# Patient Record
Sex: FEMALE | Race: BLACK OR AFRICAN AMERICAN | NOT HISPANIC OR LATINO | Employment: PART TIME | URBAN - METROPOLITAN AREA
[De-identification: names, ages, dates, MRNs, and addresses within clinical notes are randomized per-mention and may not be internally consistent; named-entity substitution may affect disease eponyms.]

---

## 2020-05-26 PROBLEM — R10.32 LLQ ABDOMINAL PAIN: Status: ACTIVE | Noted: 2020-05-26

## 2020-07-21 PROBLEM — K59.09 CHRONIC CONSTIPATION: Status: ACTIVE | Noted: 2020-07-21

## 2020-07-21 PROBLEM — R11.0 NAUSEA: Status: ACTIVE | Noted: 2020-07-21

## 2020-07-21 PROBLEM — R19.8 TENESMUS (RECTAL): Status: ACTIVE | Noted: 2020-07-21

## 2020-07-21 PROBLEM — R10.13 EPIGASTRIC PAIN: Status: ACTIVE | Noted: 2020-07-21

## 2021-10-19 PROBLEM — R76.8 FALSE POSITIVE SYPHILIS SEROLOGY: Status: ACTIVE | Noted: 2021-10-19

## 2022-04-06 PROBLEM — G89.29 CHRONIC MIDLINE LOW BACK PAIN WITHOUT SCIATICA: Status: ACTIVE | Noted: 2018-06-29

## 2022-04-06 PROBLEM — R11.0 NAUSEA: Status: RESOLVED | Noted: 2020-07-21 | Resolved: 2022-04-06

## 2022-04-06 PROBLEM — R55 SYNCOPE AND COLLAPSE: Status: ACTIVE | Noted: 2022-04-06

## 2022-04-06 PROBLEM — R10.13 EPIGASTRIC PAIN: Status: RESOLVED | Noted: 2020-07-21 | Resolved: 2022-04-06

## 2022-04-06 PROBLEM — R19.8 TENESMUS (RECTAL): Status: RESOLVED | Noted: 2020-07-21 | Resolved: 2022-04-06

## 2022-04-06 PROBLEM — R76.8 FALSE POSITIVE SYPHILIS SEROLOGY: Status: RESOLVED | Noted: 2021-10-19 | Resolved: 2022-04-06

## 2022-04-06 PROBLEM — E88.09 HYPOALBUMINEMIA: Status: ACTIVE | Noted: 2022-04-06

## 2022-04-06 PROBLEM — E87.8 HYPOCHLOREMIA: Status: ACTIVE | Noted: 2022-04-06

## 2022-04-06 PROBLEM — I10 ESSENTIAL HYPERTENSION: Chronic | Status: ACTIVE | Noted: 2022-04-06

## 2022-04-06 PROBLEM — S09.90XA HEAD TRAUMA: Status: ACTIVE | Noted: 2022-04-06

## 2022-04-06 PROBLEM — R10.32 LLQ ABDOMINAL PAIN: Status: RESOLVED | Noted: 2020-05-26 | Resolved: 2022-04-06

## 2022-04-06 PROBLEM — R94.31 ABNORMAL EKG: Status: ACTIVE | Noted: 2022-04-06

## 2022-04-06 PROBLEM — E83.51 HYPOCALCEMIA: Status: ACTIVE | Noted: 2022-04-06

## 2022-04-06 PROBLEM — M54.50 CHRONIC MIDLINE LOW BACK PAIN WITHOUT SCIATICA: Status: ACTIVE | Noted: 2018-06-29

## 2022-04-06 PROBLEM — K59.09 CHRONIC CONSTIPATION: Status: RESOLVED | Noted: 2020-07-21 | Resolved: 2022-04-06

## 2022-04-07 PROBLEM — S09.90XA HEAD TRAUMA: Status: RESOLVED | Noted: 2022-04-06 | Resolved: 2022-04-07

## 2022-04-07 PROBLEM — E87.6 HYPOKALEMIA: Status: RESOLVED | Noted: 2022-04-07 | Resolved: 2022-04-07

## 2022-04-07 PROBLEM — R55 SYNCOPE AND COLLAPSE: Status: RESOLVED | Noted: 2022-04-06 | Resolved: 2022-04-07

## 2022-04-07 PROBLEM — E87.8 HYPERCHLOREMIA: Status: RESOLVED | Noted: 2022-04-06 | Resolved: 2022-04-07

## 2022-04-07 PROBLEM — E87.6 HYPOKALEMIA: Status: ACTIVE | Noted: 2022-04-07

## 2022-04-07 PROBLEM — E88.09 HYPOALBUMINEMIA: Status: RESOLVED | Noted: 2022-04-06 | Resolved: 2022-04-07

## 2022-05-24 ENCOUNTER — SOCIAL WORK (OUTPATIENT)
Dept: ADMINISTRATIVE | Facility: OTHER | Age: 37
End: 2022-05-24
Payer: MEDICAID

## 2022-05-24 NOTE — PROGRESS NOTES
SW met with pt regarding initial OB assessment. Pt stated this is her 6th pregnancy/1-miscarriage. Pt stated lives with her /children-18,8,6,4 and able to perform ADL's independently. Pt stated does work. Pt stated support system is her /Kenzie. Pt stated has medicaid([employer]). Pt stated does not have WIC. Pt stated is going to breastfeed. SW provide pt with information on other community resources.SW faxed and scanned pt's notification of pregnancy into epic.  No other needs identified at this time.    Suze Gonzalez,MSW  Pager#[phone]

## 2022-06-09 PROBLEM — R10.13 EPIGASTRIC PAIN DURING PREGNANCY, ANTEPARTUM: Status: ACTIVE | Noted: 2022-06-09

## 2022-06-09 PROBLEM — O26.899 EPIGASTRIC PAIN DURING PREGNANCY, ANTEPARTUM: Status: ACTIVE | Noted: 2022-06-09

## 2022-07-02 PROBLEM — R10.33: Status: ACTIVE | Noted: 2022-07-02

## 2022-07-02 PROBLEM — O26.891: Status: ACTIVE | Noted: 2022-07-02

## 2022-07-12 PROBLEM — Z98.891 HISTORY OF CESAREAN DELIVERY: Status: ACTIVE | Noted: 2022-07-12

## 2022-07-12 PROBLEM — Z98.891 HISTORY OF VBAC: Status: ACTIVE | Noted: 2022-07-12

## 2022-07-12 PROBLEM — O09.92 SUPERVISION OF HIGH RISK PREGNANCY IN SECOND TRIMESTER: Status: ACTIVE | Noted: 2022-07-12

## 2022-07-12 PROBLEM — A60.9 HSV (HERPES SIMPLEX VIRUS) ANOGENITAL INFECTION: Status: ACTIVE | Noted: 2022-07-12

## 2022-07-12 PROBLEM — O09.522 MULTIGRAVIDA OF ADVANCED MATERNAL AGE IN SECOND TRIMESTER: Status: ACTIVE | Noted: 2022-07-12

## 2022-07-14 ENCOUNTER — SOCIAL WORK (OUTPATIENT)
Dept: ADMINISTRATIVE | Facility: OTHER | Age: 37
End: 2022-07-14
Payer: MEDICAID

## 2022-07-14 NOTE — PROGRESS NOTES
SW received pt's FMLA paperwork from the pt. SW completed the demographic sheet/attached clinical note on pt's FMLA paperwork and gave the paperwork to MD for review/signature.SW later received pt's completed FMLA Paperwork from MD and fax to([phone])[facility] [employer]. Pt is aware of paperwork completed and faxed. SW scanned paperwork into epic. No other needs identified at this time.    Suze Gonzalez,MSW  Pager#[phone]

## 2022-08-02 DIAGNOSIS — U07.1 COVID-19 VIRUS DETECTED: ICD-10-CM

## 2022-08-02 PROBLEM — O98.512 COVID-19 AFFECTING PREGNANCY IN SECOND TRIMESTER: Status: ACTIVE | Noted: 2022-08-02

## 2022-08-24 PROBLEM — O26.899 EPIGASTRIC PAIN DURING PREGNANCY, ANTEPARTUM: Status: RESOLVED | Noted: 2022-06-09 | Resolved: 2022-08-24

## 2022-08-24 PROBLEM — G89.29 CHRONIC MIDLINE LOW BACK PAIN WITHOUT SCIATICA: Status: RESOLVED | Noted: 2018-06-29 | Resolved: 2022-08-24

## 2022-08-24 PROBLEM — M54.50 CHRONIC MIDLINE LOW BACK PAIN WITHOUT SCIATICA: Status: RESOLVED | Noted: 2018-06-29 | Resolved: 2022-08-24

## 2022-08-24 PROBLEM — R10.13 EPIGASTRIC PAIN DURING PREGNANCY, ANTEPARTUM: Status: RESOLVED | Noted: 2022-06-09 | Resolved: 2022-08-24

## 2022-09-21 PROBLEM — O26.891: Status: RESOLVED | Noted: 2022-07-02 | Resolved: 2022-09-21

## 2022-09-21 PROBLEM — R10.33: Status: RESOLVED | Noted: 2022-07-02 | Resolved: 2022-09-21

## 2022-12-09 PROBLEM — O09.93 SUPERVISION OF HIGH RISK PREGNANCY IN THIRD TRIMESTER: Status: ACTIVE | Noted: 2022-07-12

## 2022-12-09 PROBLEM — O26.893 PREGNANCY HEADACHE IN THIRD TRIMESTER: Status: ACTIVE | Noted: 2022-12-09

## 2022-12-09 PROBLEM — R10.13 EPIGASTRIC PAIN: Status: ACTIVE | Noted: 2022-12-09

## 2022-12-09 PROBLEM — O47.9 UTERINE CONTRACTIONS AT GREATER THAN 20 WEEKS OF GESTATION: Status: ACTIVE | Noted: 2022-12-09

## 2022-12-09 PROBLEM — R51.9 PREGNANCY HEADACHE IN THIRD TRIMESTER: Status: ACTIVE | Noted: 2022-12-09

## 2022-12-16 PROBLEM — O47.9 UTERINE CONTRACTIONS DURING PREGNANCY: Status: ACTIVE | Noted: 2022-12-16

## 2022-12-27 ENCOUNTER — SOCIAL WORK (OUTPATIENT)
Dept: ADMINISTRATIVE | Facility: OTHER | Age: 37
End: 2022-12-27
Payer: MEDICAID

## 2022-12-27 NOTE — PROGRESS NOTES
YARI made phone call to pt's([phone])regarding her FMLA paperwork. YARI informed pt has received FMLA paperwork via fax from Samantha Ron-medical assistant at the [facility].YARI informed pt one of the page is fill out incorrectly;per pt will bring another copy of the FMLA paperwork to be corrected. YARI completed the demographic sheet/attached clinical note on pt's FMLA paperwork and gave the paperwork to MD for review/signature.YAIR later received pt's completed FMLA Paperwork from MD and fax to([phone])[facility]. SW provide pt with a copy of the FMLA paperwork. YARI scanned the paperwork into epic. No other needs identified at this time.    Suze Gonzalez,MSW  Pager#[phone]

## 2022-12-28 PROBLEM — O28.8 EQUIVOCAL NON-STRESS TEST: Status: ACTIVE | Noted: 2022-12-28

## 2022-12-29 PROBLEM — O34.219 VBAC, DELIVERED, CURRENT HOSPITALIZATION: Status: ACTIVE | Noted: 2022-12-29

## 2023-08-25 PROBLEM — Z32.01 PREGNANCY TEST POSITIVE: Status: ACTIVE | Noted: 2022-06-16

## 2023-08-25 PROBLEM — M54.50 LOW BACK PAIN: Status: ACTIVE | Noted: 2021-08-16

## 2023-08-25 PROBLEM — Z13.220 ENCOUNTER FOR LIPID SCREENING FOR CARDIOVASCULAR DISEASE: Status: ACTIVE | Noted: 2021-04-16

## 2023-08-25 PROBLEM — O09.522 MULTIGRAVIDA OF ADVANCED MATERNAL AGE IN SECOND TRIMESTER: Status: RESOLVED | Noted: 2022-07-12 | Resolved: 2023-08-25

## 2023-08-25 PROBLEM — R51.9 PREGNANCY HEADACHE IN THIRD TRIMESTER: Status: RESOLVED | Noted: 2022-12-09 | Resolved: 2023-08-25

## 2023-08-25 PROBLEM — O47.9 UTERINE CONTRACTIONS DURING PREGNANCY: Status: RESOLVED | Noted: 2022-12-16 | Resolved: 2023-08-25

## 2023-08-25 PROBLEM — I10 ESSENTIAL HYPERTENSION: Chronic | Status: ACTIVE | Noted: 2021-04-16

## 2023-08-25 PROBLEM — O98.512 COVID-19 AFFECTING PREGNANCY IN SECOND TRIMESTER: Status: RESOLVED | Noted: 2022-08-02 | Resolved: 2023-08-25

## 2023-08-25 PROBLEM — O47.9 UTERINE CONTRACTIONS AT GREATER THAN 20 WEEKS OF GESTATION: Status: RESOLVED | Noted: 2022-12-09 | Resolved: 2023-08-25

## 2023-08-25 PROBLEM — Z32.01 PREGNANCY TEST POSITIVE: Status: RESOLVED | Noted: 2022-06-16 | Resolved: 2023-08-25

## 2023-08-25 PROBLEM — O09.93 SUPERVISION OF HIGH RISK PREGNANCY IN THIRD TRIMESTER: Status: RESOLVED | Noted: 2022-07-12 | Resolved: 2023-08-25

## 2023-08-25 PROBLEM — S16.1XXA STRAIN OF NECK MUSCLE: Status: ACTIVE | Noted: 2021-08-09

## 2023-08-25 PROBLEM — M54.2 NECK PAIN: Status: ACTIVE | Noted: 2021-08-16

## 2023-08-25 PROBLEM — R55 SYNCOPE: Status: ACTIVE | Noted: 2022-06-16

## 2023-08-25 PROBLEM — U07.1 COVID-19 AFFECTING PREGNANCY IN SECOND TRIMESTER: Status: RESOLVED | Noted: 2022-08-02 | Resolved: 2023-08-25

## 2023-08-25 PROBLEM — O34.219 VBAC, DELIVERED, CURRENT HOSPITALIZATION: Status: RESOLVED | Noted: 2022-12-29 | Resolved: 2023-08-25

## 2023-08-25 PROBLEM — Z13.0 SCREENING FOR IRON DEFICIENCY ANEMIA: Status: ACTIVE | Noted: 2021-04-16

## 2023-08-25 PROBLEM — O26.893 PREGNANCY HEADACHE IN THIRD TRIMESTER: Status: RESOLVED | Noted: 2022-12-09 | Resolved: 2023-08-25

## 2023-08-25 PROBLEM — Z13.6 ENCOUNTER FOR LIPID SCREENING FOR CARDIOVASCULAR DISEASE: Status: ACTIVE | Noted: 2021-04-16

## 2023-11-27 PROBLEM — Z13.220 ENCOUNTER FOR LIPID SCREENING FOR CARDIOVASCULAR DISEASE: Status: RESOLVED | Noted: 2021-04-16 | Resolved: 2023-11-27

## 2023-11-27 PROBLEM — Z13.0 SCREENING FOR IRON DEFICIENCY ANEMIA: Status: RESOLVED | Noted: 2021-04-16 | Resolved: 2023-11-27

## 2023-11-27 PROBLEM — Z13.6 ENCOUNTER FOR LIPID SCREENING FOR CARDIOVASCULAR DISEASE: Status: RESOLVED | Noted: 2021-04-16 | Resolved: 2023-11-27

## 2025-04-23 ENCOUNTER — CLINICAL SUPPORT (OUTPATIENT)
Dept: OTHER | Facility: CLINIC | Age: 40
End: 2025-04-23

## 2025-04-23 DIAGNOSIS — Z00.8 ENCOUNTER FOR OTHER GENERAL EXAMINATION: ICD-10-CM

## 2025-04-24 VITALS
HEIGHT: 65 IN | WEIGHT: 158.81 LBS | SYSTOLIC BLOOD PRESSURE: 124 MMHG | BODY MASS INDEX: 26.46 KG/M2 | DIASTOLIC BLOOD PRESSURE: 74 MMHG

## 2025-04-24 LAB
GLUCOSE SERPL-MCNC: 86 MG/DL (ref 60–140)
HDLC SERPL-MCNC: 65 MG/DL
POC CHOLESTEROL, LDL (DOCK): 91 MG/DL
POC CHOLESTEROL, TOTAL: 168 MG/DL
TRIGL SERPL-MCNC: 59 MG/DL